# Patient Record
Sex: FEMALE | Race: WHITE | Employment: FULL TIME | URBAN - METROPOLITAN AREA
[De-identification: names, ages, dates, MRNs, and addresses within clinical notes are randomized per-mention and may not be internally consistent; named-entity substitution may affect disease eponyms.]

---

## 2019-06-28 ENCOUNTER — OFFICE VISIT (OUTPATIENT)
Dept: GYNECOLOGY | Age: 24
End: 2019-06-28
Payer: COMMERCIAL

## 2019-06-28 VITALS
HEIGHT: 66 IN | HEART RATE: 76 BPM | WEIGHT: 164.2 LBS | TEMPERATURE: 97 F | BODY MASS INDEX: 26.39 KG/M2 | DIASTOLIC BLOOD PRESSURE: 81 MMHG | SYSTOLIC BLOOD PRESSURE: 114 MMHG

## 2019-06-28 DIAGNOSIS — N83.202 LEFT OVARIAN CYST: Primary | ICD-10-CM

## 2019-06-28 PROCEDURE — 99385 PREV VISIT NEW AGE 18-39: CPT | Performed by: OBSTETRICS & GYNECOLOGY

## 2019-06-28 RX ORDER — NORGESTIMATE AND ETHINYL ESTRADIOL 0.25-0.035
1 KIT ORAL DAILY
Qty: 1 PACKET | Refills: 11 | Status: SHIPPED | OUTPATIENT
Start: 2019-06-28

## 2019-06-28 RX ORDER — NAPROXEN 500 MG/1
500 TABLET ORAL 2 TIMES DAILY WITH MEALS
COMMUNITY

## 2019-06-28 RX ORDER — OXYCODONE HYDROCHLORIDE AND ACETAMINOPHEN 5; 325 MG/1; MG/1
1 TABLET ORAL EVERY 4 HOURS PRN
COMMUNITY

## 2019-06-28 NOTE — PROGRESS NOTES
Subjective:      Patient ID: Harman Glass is a 20 y.o. female. HPI  pts here as a referral from [facility]. She has a one week h/o 4 cm left ov cyst with tenderness. Recommend repeat US to ensure resolution. Also discussed ocps to decrease reoccurance of ov cyst.  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 Needs pcp. Review of Systems Pertinent review of systems items discussed above. All others systems items not discussed above were negative. Objective:   Physical Exam   Constitutional: She is oriented to person, place, and time. She appears well-developed and well-nourished. HENT:   Head: Normocephalic and atraumatic. Neck: No tracheal deviation present. No thyromegaly present. Cardiovascular: Normal rate, regular rhythm and normal heart sounds. No murmur heard. Pulmonary/Chest: Effort normal and breath sounds normal. No respiratory distress. She has no wheezes. She has no rales. Right breast exhibits no mass, no nipple discharge and no skin change. Left breast exhibits no mass, no nipple discharge and no skin change. No breast tenderness (no masses), discharge or bleeding. Abdominal: Soft. She exhibits no distension and no mass. There is no tenderness. There is no rebound. Genitourinary: Vagina normal and uterus normal. Rectal exam shows no external hemorrhoid. No breast tenderness (no masses), discharge or bleeding. There is no lesion on the right labia. There is no lesion on the left labia. Uterus is not deviated, not enlarged, not fixed and not tender. Cervix exhibits no motion tenderness, no discharge and no friability. Right adnexum displays no mass and no tenderness. Left adnexum displays tenderness. Left adnexum displays no mass. No foreign body in the vagina. No vaginal discharge found. Genitourinary Comments: Pap performed. Musculoskeletal: Normal range of motion.    Lymphadenopathy: She has no cervical adenopathy. Neurological: She is alert and oriented to person, place, and time. Assessment:      Normal gyn exam, left ov cyst      Plan:      Pelvic US in one month. rx sprintec. Instructions for use given. Call with results.         Mandy Ventura MD

## 2019-06-28 NOTE — PROGRESS NOTES
new patient hemorrhagic cyst referred by [place] nurse practitioner. pain for 1 week. Last pap over a year ago.

## 2019-07-01 ENCOUNTER — TELEPHONE (OUTPATIENT)
Dept: FAMILY MEDICINE CLINIC | Age: 24
End: 2019-07-01

## 2019-07-02 ENCOUNTER — TELEPHONE (OUTPATIENT)
Dept: FAMILY MEDICINE CLINIC | Age: 24
End: 2019-07-02

## 2019-07-02 LAB
HPV COMMENT: NORMAL
HPV TYPE 16: NOT DETECTED
HPV TYPE 18: NOT DETECTED
HPVOH (OTHER TYPES): NOT DETECTED

## 2019-07-17 ENCOUNTER — HOSPITAL ENCOUNTER (OUTPATIENT)
Dept: ULTRASOUND IMAGING | Age: 24
Discharge: HOME | End: 2019-07-17
Payer: COMMERCIAL

## 2019-07-17 DIAGNOSIS — N83.202 LEFT OVARIAN CYST: ICD-10-CM

## 2019-07-17 PROCEDURE — 76856 US EXAM PELVIC COMPLETE: CPT

## 2019-07-17 PROCEDURE — 76830 TRANSVAGINAL US NON-OB: CPT
